# Patient Record
Sex: MALE | Race: WHITE | NOT HISPANIC OR LATINO | URBAN - METROPOLITAN AREA
[De-identification: names, ages, dates, MRNs, and addresses within clinical notes are randomized per-mention and may not be internally consistent; named-entity substitution may affect disease eponyms.]

---
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
## 2020-09-30 VITALS
WEIGHT: 203 LBS | BODY MASS INDEX: 27.5 KG/M2 | HEIGHT: 72 IN | DIASTOLIC BLOOD PRESSURE: 70 MMHG | HEART RATE: 60 BPM | SYSTOLIC BLOOD PRESSURE: 125 MMHG

## 2020-09-30 DIAGNOSIS — S09.90XA INJURY OF HEAD, INITIAL ENCOUNTER: Primary | ICD-10-CM

## 2020-09-30 PROCEDURE — 99214 OFFICE O/P EST MOD 30 MIN: CPT | Performed by: ORTHOPAEDIC SURGERY

## 2020-09-30 NOTE — LETTER
September 30, 2020     Patient: Don Cali   YOB: 2000   Date of Visit: 9/30/2020       To Whom it May Concern:    Don Cali is under my professional care  He was seen in my office on 9/30/2020  He  May continue to do light to moderate aerobic non contact activity as long as no symptom recurrence  Kindly administer impact neuro cognitive test on 10/2/2020 and forward results including baseline test results for review  No academic restrictions at this time  Follow-up next week in the training room  If you have any questions or concerns, please don't hesitate to call           Sincerely,          Conrad Mayes MD        CC: Don Cali

## 2020-09-30 NOTE — PROGRESS NOTES
Assessment:       1  Injury of head, initial encounter          Plan:        I explained the current clinical findings to [facility] today  I have low suspicion of any concussion injury at this time  However, I did explain to him that occasionally concussion symptoms may take 48-72 hours to gradually appear  His clinical exam today did not reveal any focal neurological deficits, ocular motor deficit or vestibular deficit  He may proceed with full echogenic activities at this time and may also do light to moderate aerobic activities as long as there is no symptom recurrence  I will suggest him to do an impact neurocognitive test in 2 days time and followed the results for review along with a baseline test results  If he remains asymptomatic and his neuro cognitive test is also close to his baseline then he may gradually progress to graded return to play protocol  Subjective:     Patient ID: Antonio Cannon is a 21 y o  male  Chief Complaint:  Head injury    HPI    [facility] is a 66-year-old [employer] who is here today for assessment of potential concussion injury  He reports getting struck in the right temporal area yesterday with a lacrosse ball during sports practice  Subsequently, he had a headache that subsequently resolved after a few hours  Upon waking up this morning he reports no further symptoms including no headache and he has been able to do his classes virtually as well as the wall cognitive activities without discomfort  At this time he feels that he is back to his baseline  He does have a known history of 1 previous concussion in the past several years ago from which he fully recovered  Denies any history of chronic headaches, migraines or other neurological, psychological disorders or learning disabilities       Social History     Occupational History    Not on file   Tobacco Use    Smoking status: Never Smoker    Smokeless tobacco: Never Used   Substance and Sexual Activity    Alcohol use: Not on file    Drug use: Not on file    Sexual activity: Not on file      Review of Systems   Constitutional: Negative  HENT: Negative  Eyes: Negative  Respiratory: Negative  Cardiovascular: Negative  Gastrointestinal: Negative  Endocrine: Negative  Genitourinary: Negative  Skin: Negative  Allergic/Immunologic: Negative  Neurological: Negative  Psychiatric/Behavioral: Negative  Objective:     Ortho ExamPhysical Exam  Vitals signs and nursing note reviewed  Constitutional:       Appearance: He is well-developed  HENT:      Head: Normocephalic and atraumatic  Right Ear: Tympanic membrane normal    Eyes:      Conjunctiva/sclera: Conjunctivae normal    Cardiovascular:      Rate and Rhythm: Normal rate and regular rhythm  Pulmonary:      Effort: Pulmonary effort is normal  No respiratory distress  Skin:     General: Skin is warm  Findings: No erythema  Neurological:      Mental Status: He is alert and oriented to person, place, and time  Psychiatric:         Behavior: Behavior normal          Thought Content:  Thought content normal          Judgment: Judgment normal                Physical Exam     /70 (BP Location: Left arm, Patient Position: Sitting, Cuff Size: Standard)   Pulse 60   Ht 6' (1 829 m)   Wt 92 1 kg (203 lb)   BMI 27 53 kg/m²   General:   NAD:  Yes  Psych:   AAOX3:  Yes   Mood and Affect:  Normal  HEENT:   Lacerations:  No   Bruising:  No   PEERLA:  Yes   EOMI:  Yes   C/D/I:  Yes   Fracture/Trauma:  No   Fundi Discs Sharp:  N/A  Neuro:   Examination of Coordination:  Normal   CNII - XII Intact:  Yes   FTN:  Normal   Accommodation:  6cm   Convergence:  6cm  Vestibular Ocular:  Gaze stability:  Normal

## 2021-04-12 DIAGNOSIS — Z23 ENCOUNTER FOR IMMUNIZATION: ICD-10-CM

## 2021-04-25 ENCOUNTER — IMMUNIZATIONS (OUTPATIENT)
Dept: FAMILY MEDICINE CLINIC | Facility: HOSPITAL | Age: 21
End: 2021-04-25

## 2021-04-25 DIAGNOSIS — Z23 ENCOUNTER FOR IMMUNIZATION: Primary | ICD-10-CM

## 2021-04-25 PROCEDURE — 91300 SARS-COV-2 / COVID-19 MRNA VACCINE (PFIZER-BIONTECH) 30 MCG: CPT

## 2021-04-25 PROCEDURE — 0001A SARS-COV-2 / COVID-19 MRNA VACCINE (PFIZER-BIONTECH) 30 MCG: CPT

## 2021-05-22 ENCOUNTER — IMMUNIZATIONS (OUTPATIENT)
Dept: FAMILY MEDICINE CLINIC | Facility: HOSPITAL | Age: 21
End: 2021-05-22

## 2021-05-22 DIAGNOSIS — Z23 ENCOUNTER FOR IMMUNIZATION: Primary | ICD-10-CM

## 2021-05-22 PROCEDURE — 0002A SARS-COV-2 / COVID-19 MRNA VACCINE (PFIZER-BIONTECH) 30 MCG: CPT

## 2021-05-22 PROCEDURE — 91300 SARS-COV-2 / COVID-19 MRNA VACCINE (PFIZER-BIONTECH) 30 MCG: CPT
